# Patient Record
Sex: MALE | Race: ASIAN | Employment: FULL TIME | URBAN - METROPOLITAN AREA
[De-identification: names, ages, dates, MRNs, and addresses within clinical notes are randomized per-mention and may not be internally consistent; named-entity substitution may affect disease eponyms.]

---

## 2019-11-11 ENCOUNTER — OFFICE VISIT (OUTPATIENT)
Dept: FAMILY MEDICINE | Facility: CLINIC | Age: 44
End: 2019-11-11
Payer: COMMERCIAL

## 2019-11-11 VITALS
HEIGHT: 68 IN | RESPIRATION RATE: 16 BRPM | TEMPERATURE: 97.9 F | SYSTOLIC BLOOD PRESSURE: 134 MMHG | DIASTOLIC BLOOD PRESSURE: 88 MMHG | HEART RATE: 65 BPM | WEIGHT: 186.2 LBS | OXYGEN SATURATION: 100 % | BODY MASS INDEX: 28.22 KG/M2

## 2019-11-11 DIAGNOSIS — Z23 NEED FOR VACCINATION: ICD-10-CM

## 2019-11-11 DIAGNOSIS — Z23 NEED FOR PROPHYLACTIC VACCINATION AND INOCULATION AGAINST INFLUENZA: ICD-10-CM

## 2019-11-11 DIAGNOSIS — Z71.84 TRAVEL ADVICE ENCOUNTER: Primary | ICD-10-CM

## 2019-11-11 RX ORDER — ATOVAQUONE AND PROGUANIL HYDROCHLORIDE 250; 100 MG/1; MG/1
TABLET, FILM COATED ORAL
Qty: 18 TABLET | Refills: 0 | Status: SHIPPED | OUTPATIENT
Start: 2019-11-11

## 2019-11-11 RX ORDER — AZITHROMYCIN 500 MG/1
TABLET, FILM COATED ORAL
Qty: 1 TABLET | Refills: 0 | Status: SHIPPED | OUTPATIENT
Start: 2019-11-11

## 2019-11-11 ASSESSMENT — MIFFLIN-ST. JEOR: SCORE: 1714.1

## 2019-11-11 NOTE — PATIENT INSTRUCTIONS
Drink only bottled water  Avoid mosquito bites - use mosquito nets when you sleep and bring mosquito spray (containing DEET) with you    If you get diarrhea, use IMODIUM if it is just MILD  If it is severe, take the one Azithromycin tablet

## 2019-11-11 NOTE — PROGRESS NOTES
[facility] Travel Visit       Leila Small is a 44 year old male who presents for a pre-travel assessment visit.  He will be traveling to:    Destination(s):  Destination 1  [facility]  Date of arrival 11/25/19  Date of departure 11/27/19,   Destination 2  [facility] and surrounding areas, [facility]  Date of arrival 11/27/19  Date of departure 12/1/19 and   Destination 3  [facility]  Date of arrival 12/1/19  Date of departure 12/5/19  Destination 4  [facility]  Date of arrival 12/5/19  Date of departure 12/8/19    Reason for travel: Vacation    Leila Small has completed the travel questionnaire and it is reviewed: YES  Are there special circumstances which place this traveler at higher risk (List if 'yes')?: YES - unknown primary vaccination history.  Grew up in [facility] so he assumes that he got initial vaccinations there.  Is comfortable with that assumption - and declines serologic testing of immunity or repeating primary series today.     History reviewed. No pertinent past medical history.    No current outpatient medications on file prior to visit.  No current facility-administered medications on file prior to visit.         No Known Allergies    Immunizations, travel specific:  -- Yellow fever: Not Required, Not Recommended  -- Hep A: Immunity status unknown  -- Hep B: Immunity status unknown  -- Typhoid (IM: booster every 2 years; PO: booster every 5 years): Known to be not immune, not immunized  -- Rabies  (Data on the need for and timing of additional booster doses are not available): Immunity status unknown  -- Meningococcal meningitis Immunity status unknown   -- Kosovan encephalitis (Data on the need for and timing of additional booster doses are not available):Immunity status unknown    Immunizations, routine adult:  -- Influenza Known to be not immune, not immunized  -- Polio: Immunity status unknown  -- Diphtheria, Tetanus & Pertussis (DTaP): Immunity status unknown  -- Measles/  "Mumps/ Rubella: Immunity status unknown  -- Varicella: Not needed for persons born in the [place] before 1978  -- Pneumococcal (PPSV23 (Pneumovax)): Immunity status unknown  -- Pneumococcal (PCV13 (Prevnar)): Immunity status unknown    Malaria prophylaxis:  Recommended (refer to Rick for destination-specific recommendation) YES       Review of Systems:     CONSTITUTIONAL: NEGATIVE for fever, chills, change in weight  ENT/MOUTH: NEGATIVE for ear, mouth and throat problems  RESP: NEGATIVE for significant cough or SOB  CV: NEGATIVE for chest pain, palpitations or peripheral edema          Objective:     /88 (BP Location: Right arm, Patient Position: Sitting, Cuff Size: Adult Regular)   Pulse 65   Temp 97.9  F (36.6  C) (Oral)   Resp 16   Ht 1.727 m (5' 8\")   Wt 84.5 kg (186 lb 3.2 oz)   SpO2 100%   BMI 28.31 kg/m    Body mass index is 28.31 kg/m .    GENERAL: healthy, alert, well nourished, well hydrated, no distress  RESP: lungs clear to auscultation - no rales, no rhonchi, no wheezes  CV: regular rates and rhythm, normal S1 S2, no S3 or S4 and no murmur, no click or rub -         Assessment and Plan   Heu was seen today for travel clinic and imm/inj.    Diagnoses and all orders for this visit:    Travel advice encounter  -     atovaquone-proguanil (MALARONE) 250-100 MG tablet; Take 1 daily, start 2 days before travel and continue 7 days after return.  -     azithromycin (ZITHROMAX) 500 MG tablet; Take one tablet (500mgs) ONCE in the case of SEVERE diarrhea    Need for prophylactic vaccination and inoculation against influenza  -     Fluzone quad, multidose 0.5ml, 6+ months [97715]    Need for vaccination  -     ADMIN VACCINE, INITIAL  -     ADMIN VACCINE, EACH ADDITIONAL  -     HEPATITIS A VACCINE ADULT IM  -     TYPHOID VACCINE, IM      Based on patient's past history, review of immunization and immunity status, planned itinerary and current recommendations, the following are recommended:    Hep " A vaccine   Typhoid vaccine   Influenza vaccine  He declines booster TdaP and cannot confirm when he had last received this.  Malaria: Malarone: Begin 1-2 days before travel. Take daily while in the malarious area and for 7 days after returning.  Traveler's diarrhea treatment prescribed.  I spent 5 min in counselling and coordination of care on food and water precautions DEET and mosquito netting    Patient is given a copy of the [facility] traveller report as well as destination-specific recommendations on sun protection, avoiding insect bites and travel safety.      Total of 30 minutes was spent in face to face contact with patient with > 50% in counseling and coordination of care.  Options for treatment and/or follow-up care were reviewed with the patient. Leila Small was engaged and actively involved in the decision making process. He verbalized understanding of the options discussed and was satisfied with the final plan.    Recommend following up for Health maintenance exam at some point upon his return.        Oracio Crespo MD MD